# Patient Record
Sex: FEMALE | Race: WHITE | Employment: OTHER | URBAN - NONMETROPOLITAN AREA
[De-identification: names, ages, dates, MRNs, and addresses within clinical notes are randomized per-mention and may not be internally consistent; named-entity substitution may affect disease eponyms.]

---

## 2021-04-08 ENCOUNTER — IMMUNIZATION (OUTPATIENT)
Dept: FAMILY MEDICINE | Facility: OTHER | Age: 66
End: 2021-04-08
Attending: FAMILY MEDICINE
Payer: COMMERCIAL

## 2021-04-08 PROCEDURE — 0001A PR COVID VAC PFIZER DIL RECON 30 MCG/0.3 ML IM: CPT

## 2021-04-08 PROCEDURE — 91300 PR COVID VAC PFIZER DIL RECON 30 MCG/0.3 ML IM: CPT

## 2021-04-29 ENCOUNTER — IMMUNIZATION (OUTPATIENT)
Dept: FAMILY MEDICINE | Facility: OTHER | Age: 66
End: 2021-04-29
Attending: FAMILY MEDICINE
Payer: COMMERCIAL

## 2021-04-29 PROCEDURE — 91300 PR COVID VAC PFIZER DIL RECON 30 MCG/0.3 ML IM: CPT

## 2021-04-29 PROCEDURE — 0002A PR COVID VAC PFIZER DIL RECON 30 MCG/0.3 ML IM: CPT

## 2021-05-11 ENCOUNTER — OFFICE VISIT (OUTPATIENT)
Dept: OBGYN | Facility: OTHER | Age: 66
End: 2021-05-11
Attending: STUDENT IN AN ORGANIZED HEALTH CARE EDUCATION/TRAINING PROGRAM
Payer: COMMERCIAL

## 2021-05-11 VITALS — WEIGHT: 121.4 LBS | SYSTOLIC BLOOD PRESSURE: 100 MMHG | DIASTOLIC BLOOD PRESSURE: 64 MMHG | HEART RATE: 64 BPM

## 2021-05-11 DIAGNOSIS — N36.2 URETHRAL POLYP: Primary | ICD-10-CM

## 2021-05-11 PROCEDURE — 99203 OFFICE O/P NEW LOW 30 MIN: CPT | Performed by: STUDENT IN AN ORGANIZED HEALTH CARE EDUCATION/TRAINING PROGRAM

## 2021-05-11 RX ORDER — PETROLATUM,WHITE/LANOLIN
OINTMENT (GRAM) TOPICAL
COMMUNITY

## 2021-05-11 ASSESSMENT — PAIN SCALES - GENERAL: PAINLEVEL: NO PAIN (0)

## 2021-05-11 NOTE — NURSING NOTE
Pt presents to clinic today for consult on vaginal prolaspse. Pt states she did have a hysterectomy in [place] in 2002.      Medication Reconciliation: complete  Prema Mclean LPN

## 2021-05-11 NOTE — PROGRESS NOTES
Gynecology Office Visit    Chief Complaint: Concern for possible prolapse    HPI:    Margo Diaz is a 65 year old G0, here for concerns of pelvic organ prolapse. She notes that she first noticed these symptoms a few months ago. She describes her symptoms as feeling like a tampon needs to be pushed back in. In 2002 she had a hyst for adenomyoma- left tube and ovaries, vaginal hyst performed. She has also noticed with a mirror that a small red, raised lesion on the tissue that is protruding. This lesion is not bleeding. It occasionally itches and burns. She endorses mild serous vaginal discharge, denies any foul smell. Denies fevers, chills.     She can empty her bladder well and denies any constipation.     OBHx  OB History   No obstetric history on file.     G0    GYN history:   No history of STIs  No abnormal pap smears in her history  Previous hysterectomy (vaginal): ovaries and tubes remain in place done in [place] for heavy menses and adenomyosis per patient report    Past medical history:  No significant past medical history  Specifically denies VTE, DM, HTN or bleeding disorders    Past Surgical History:  Vaginal hysterectomy in 2002: [facility]    Medications:  Current Outpatient Medications   Medication     glucosamine sulfate 1000 MG CAPS     No current facility-administered medications for this visit.        Allergies:     No Known Allergies      Social History:  Social History     Tobacco Use     Smoking status: Not on file   Substance Use Topics     Alcohol use: Not on file     Drug use: Not on file       Lives at home with her mother. She feels safe at home. Her  recently passed away last spring  Denies tobacco use    Family History:  Mother: HTN, CVA  Father: CAD, CABG, brain tumor passed away 2006  Brother: 53 skin cancer  Brother: 54 HTN, prostates ca  Brother: 58 hypothyroidism  Brother: 60, hypercholesterolemia  Brother: 62, HTN    Denies any significant family medical  history  Specifically denies breast, ovarian, colon, pancreatic cancers  Specifically denies VTE, known familial thrombophilias and coagulopathies    ROS:   Skin: negative for rash, bruising  Eyes: negative for visual blurring, double vision  Ears/Nose/Throat: negative for nasal congestion, vertigo  Respiratory: No shortness of breath, dyspnea on exertion, cough, or hemoptysis  Cardiovascular: negative for palpitations, chest pain, lower extremity edema and syncope or near-syncope  Gastrointestinal: negative for, nausea, vomiting and hematemesis  Genitourinary: negative for, dysuria, frequency and urgency, + prolapse pressure and mild irritation  Musculoskeletal: negative for, back pain and muscular weakness  Neurologic: negative for, headaches, syncope, seizures and local weakness  Psychiatric: negative for, anxiety, depression and hallucinations  Hematologic/Lymphatic/Immunologic: negative for, anemia, chills and fever      Physical Exam  /64   Pulse 64   Wt 55.1 kg (121 lb 6.4 oz)   Breastfeeding No     Gen: Well-appearing, no acute distressed, well-groomed, alert  HEENT: Normocephalic, atraumatic  Cardiovascular: Regular rate. No peripheral edema, normal peripheral circulation  Pulm: Symmetric chest rise, non-labored respirations  Abd: Soft, non-tender, non-distended  Ext: No LE edema, extremities warm and well perfused  Pelvic:  Normal hair distribution. Vaginal introitus is approximately 2-3 cm in total from just below the clitoral michel to the posterior introitus. The urethra is central  Within the opening and is noted to have approximately 3 mm in diameter polypoid structure, hyperemic compared to the surrounding tissue at the external urethral meatus. It is not bleeding, it is not painful to the touch, It is not completely obstructive, but does shrink the urethral meatus caliber. She does not leak with cough in the exam. The remainder of the vaginal tissue appears healthy. I am not able to discern  prolapse in the form of cystocele or rectocele with single-sided speculum exam. The vaginal vault appears to remain in place even when asked to valsalva. Cervix and  Uterus are surgically absent. Bimanual exam does not reveal any tenderness in adnexal palpation no does it reveal any masses.     Ms. Margo Diaz is a 65 year old yo G0 who presents to office today with conerns of pelvic organ prolapse: however after exam it appears her symptoms and what she notices with the mirror are related to the external urethral meatus and a polypoid structure present.    We discussed the exam findings in detail and I recommended that she connect with Dr. Caraballo or a urology team for further evaluation of this and possible removal. At this time it is not causing obstruction-symptoms, but she does note her urine stream does not come out in a straight pattern. I discussed that I do not see any vaginal prolapse at this time. With her introital opening only measuring 2-3 cm, I feel as though she would be at an overall low risk for prolapse, but she will continue to monitor her symptoms.    Total amount of time spent during today's encounter was 35 minutes  SATISH WINN MD on 5/11/2021 at 8:51 PM

## 2021-07-14 ENCOUNTER — OFFICE VISIT (OUTPATIENT)
Dept: UROLOGY | Facility: OTHER | Age: 66
End: 2021-07-14
Attending: STUDENT IN AN ORGANIZED HEALTH CARE EDUCATION/TRAINING PROGRAM
Payer: COMMERCIAL

## 2021-07-14 VITALS
WEIGHT: 122 LBS | SYSTOLIC BLOOD PRESSURE: 118 MMHG | HEART RATE: 63 BPM | RESPIRATION RATE: 16 BRPM | DIASTOLIC BLOOD PRESSURE: 62 MMHG

## 2021-07-14 DIAGNOSIS — N36.2 URETHRAL POLYP: ICD-10-CM

## 2021-07-14 PROCEDURE — G0463 HOSPITAL OUTPT CLINIC VISIT: HCPCS

## 2021-07-14 PROCEDURE — 99203 OFFICE O/P NEW LOW 30 MIN: CPT | Performed by: UROLOGY

## 2021-07-14 ASSESSMENT — PAIN SCALES - GENERAL: PAINLEVEL: MILD PAIN (2)

## 2021-07-14 NOTE — NURSING NOTE
Pt presents to clinic for urethral polyp consult    Review of Systems:    Weight loss:    No     Recent fever/chills:  No   Night sweats:   No  Current skin rash:  No   Recent hair loss:  No  Heat intolerance:  No   Cold intolerance:  No  Chest pain:   No   Palpitations:   Yes  Shortness of breath:  No   Wheezing:   No  Constipation:    No   Diarrhea:   No   Nausea:   No   Vomiting:   No   Kidney/side pain:  No   Back pain:   No  Frequent headaches:  No   Dizziness:     No  Leg swelling:   No   Calf pain:    No    Parents, brothers or sisters with history of kidney cancer:   No  Parents, brothers or sisters with history of bladder cancer: No  Father or brother with history of prostate cancer:  No

## 2021-07-14 NOTE — PROGRESS NOTES
Type of Visit  NPV    Chief Complaint  Urethral polyp    HPI  Ms. Diaz is a 66 year old female who presents with a urethral polyp.  She was recently seen by OB/GYN.  Pelvic exam revealed urethral polyp.  She does occasionally have local discomfort.  She has been applying hydrocortisone cream intermittently with benefit.  She describes her daily clinical bother from the polyp as low.    She denies gross hematuria, dysuria or any other new symptoms.      Past Medical History  She  has no past medical history on file.  There is no problem list on file for this patient.      Past Surgical History  She  has no past surgical history on file.    Medications  She has a current medication list which includes the following prescription(s): glucosamine sulfate.    Allergies  No Known Allergies    Social History  She  reports that she has never smoked. She has never used smokeless tobacco. She reports previous alcohol use. She reports previous drug use.  No drug abuse.    Family History  History reviewed. No pertinent family history.    Review of Systems  I personally reviewed the ROS with the patient.    Nursing Notes:   Sammie Lombardi LPN  7/14/2021  1:51 PM  Addendum  Pt presents to clinic for urethral polyp consult    Review of Systems:    Weight loss:    No     Recent fever/chills:  No   Night sweats:   No  Current skin rash:  No   Recent hair loss:  No  Heat intolerance:  No   Cold intolerance:  No  Chest pain:   No   Palpitations:   Yes  Shortness of breath:  No   Wheezing:   No  Constipation:    No   Diarrhea:   No   Nausea:   No   Vomiting:   No   Kidney/side pain:  No   Back pain:   No  Frequent headaches:  No   Dizziness:     No  Leg swelling:   No   Calf pain:    No    Parents, brothers or sisters with history of kidney cancer:   No  Parents, brothers or sisters with history of bladder cancer: No  Father or brother with history of prostate cancer:  No        Physical Exam  Vitals:    07/14/21 1351   BP:  118/62   BP Location: Right arm   Patient Position: Sitting   Cuff Size: Adult Regular   Pulse: 63   Resp: 16   Weight: 55.3 kg (122 lb)     Constitutional: NAD, WDWN  Head: NCAT  Eyes: Conjunctivae normal  Cardiovascular: Regular rate  Pulmonary/Chest: Respirations are even and non-labored bilaterally  Abdominal: Soft with no distension, tenderness, masses, guarding or CVA tenderness  Neurological: A + O x 3,  cranial nerves II-XII grossly intact  Extremities: HAWK x 4, warm, no clubbing, no cyanosis  Skin: Pink, warm, dry with no rash  Psychiatric:  Normal mood and affect  Genitourinary: nonpalpable bladder    Assessment & Plan  Ms. Diaz is a 66 year old female with a recently diagnosed urethral polyp that is of low clinical bother.  The patient has found benefit with topical low-dose steroid cream as needed.  We discussed surgical excision however I recommended this approach if her clinical bother was more severe.  Given how well she is doing I have recommended observation with as needed hydrocortisone cream as she has been using.

## 2021-08-22 ENCOUNTER — HEALTH MAINTENANCE LETTER (OUTPATIENT)
Age: 66
End: 2021-08-22

## 2021-10-17 ENCOUNTER — HEALTH MAINTENANCE LETTER (OUTPATIENT)
Age: 66
End: 2021-10-17

## 2022-04-06 ENCOUNTER — OFFICE VISIT (OUTPATIENT)
Dept: UROLOGY | Facility: OTHER | Age: 67
End: 2022-04-06
Attending: UROLOGY
Payer: COMMERCIAL

## 2022-04-06 VITALS
DIASTOLIC BLOOD PRESSURE: 86 MMHG | RESPIRATION RATE: 16 BRPM | HEART RATE: 73 BPM | OXYGEN SATURATION: 96 % | SYSTOLIC BLOOD PRESSURE: 122 MMHG | WEIGHT: 121.2 LBS

## 2022-04-06 DIAGNOSIS — N32.81 OVERACTIVE BLADDER: Primary | ICD-10-CM

## 2022-04-06 PROCEDURE — 99213 OFFICE O/P EST LOW 20 MIN: CPT | Performed by: UROLOGY

## 2022-04-06 PROCEDURE — G0463 HOSPITAL OUTPT CLINIC VISIT: HCPCS

## 2022-04-06 ASSESSMENT — PAIN SCALES - GENERAL: PAINLEVEL: NO PAIN (0)

## 2022-04-06 NOTE — PROGRESS NOTES
"Type of Visit  NPV    Chief Complaint  Urethral polyp    HPI  Ms. Diaz is a 66 year old female who presents with a \"urethral polyp\".  She denies dysuria or gross hematuria, history of other urologic problems.  She has noticed occasional itching/burning.  No concerning discharge.  Denies fevers and chills.  Voiding symptoms are minimal bother and stable.      Past Medical History  She  has no past medical history on file.  There is no problem list on file for this patient.    Past Surgical History  She  has no past surgical history on file.    Medications  She has a current medication list which includes the following prescription(s): glucosamine sulfate.    Allergies  No Known Allergies    Social History  She  reports that she has never smoked. She has never used smokeless tobacco. She reports previous alcohol use. She reports previous drug use.  No drug abuse.    Family History  No family history on file.    Review of Systems  I personally reviewed the ROS with the patient.    Nursing Notes:   Tisha Walters LPN  4/6/2022  2:08 PM  Addendum  Chief Complaint   Patient presents with     Follow Up     discuss urethral polyp     Patient presents to the clinic today for a follow up to discuss urethral polyp    Review of Systems:    Weight loss:    No     Recent fever/chills:  No   Night sweats:   No  Current skin rash:  No   Recent hair loss:  No  Heat intolerance:  No   Cold intolerance:  No  Chest pain:   No   Palpitations:   Yes  Shortness of breath:  No   Wheezing:   No  Constipation:    No   Diarrhea:   No   Nausea:   No   Vomiting:   No   Kidney/side pain:  No   Back pain:   No  Frequent headaches:  No   Dizziness:     No  Leg swelling:   No   Calf pain:    No      Medication Reconciliation: completed   Tisha Walters LPN  4/6/2022 2:01 PM       Physical Exam  Vitals:    04/06/22 1408   BP: 122/86   BP Location: Right arm   Patient Position: Sitting   Cuff Size: Adult Regular   Pulse: 73   Resp: 16   SpO2: 96% "   Weight: 55 kg (121 lb 3.2 oz)     Constitutional: No acute distress.  Alert and cooperative   Head: NCAT  Eyes: Conjunctivae normal  Cardiovascular: Regular rate.  Pulmonary/Chest: Respirations are even and non-labored bilaterally, no audible wheezing  Abdominal: Soft. No distension, tenderness, masses or guarding.   Extremities: HAWK x 4, Warm. No clubbing.  No cyanosis.    Skin: Pink, warm and dry.  No visible rashes noted.  Back:  No left CVA tenderness.  No right CVA tenderness.  Genitourinary:  Nonpalpable bladder  Visual appearance of the meatus reveals urethral prolapse    Assessment  Ms. Diaz is a 66 year old female with symptomatic urethral prolapse.    Plan  Recommended continuing barrier cream such as Desitin.

## 2022-04-06 NOTE — NURSING NOTE
Chief Complaint   Patient presents with     Follow Up     discuss urethral polyp     Patient presents to the clinic today for a follow up to discuss urethral polyp    Review of Systems:    Weight loss:    No     Recent fever/chills:  No   Night sweats:   No  Current skin rash:  No   Recent hair loss:  No  Heat intolerance:  No   Cold intolerance:  No  Chest pain:   No   Palpitations:   Yes  Shortness of breath:  No   Wheezing:   No  Constipation:    No   Diarrhea:   No   Nausea:   No   Vomiting:   No   Kidney/side pain:  No   Back pain:   No  Frequent headaches:  No   Dizziness:     No  Leg swelling:   No   Calf pain:    No      Medication Reconciliation: completed   Tisha Walters LPN  4/6/2022 2:01 PM

## 2022-10-03 ENCOUNTER — HEALTH MAINTENANCE LETTER (OUTPATIENT)
Age: 67
End: 2022-10-03

## 2023-10-21 ENCOUNTER — HEALTH MAINTENANCE LETTER (OUTPATIENT)
Age: 68
End: 2023-10-21

## 2024-12-14 ENCOUNTER — HEALTH MAINTENANCE LETTER (OUTPATIENT)
Age: 69
End: 2024-12-14